# Patient Record
Sex: MALE | Race: BLACK OR AFRICAN AMERICAN | NOT HISPANIC OR LATINO | Employment: FULL TIME | URBAN - METROPOLITAN AREA
[De-identification: names, ages, dates, MRNs, and addresses within clinical notes are randomized per-mention and may not be internally consistent; named-entity substitution may affect disease eponyms.]

---

## 2020-03-26 ENCOUNTER — OFFICE VISIT (OUTPATIENT)
Dept: INTERNAL MEDICINE | Facility: CLINIC | Age: 35
End: 2020-03-26
Payer: COMMERCIAL

## 2020-03-26 VITALS
WEIGHT: 247 LBS | BODY MASS INDEX: 30.71 KG/M2 | DIASTOLIC BLOOD PRESSURE: 80 MMHG | HEART RATE: 78 BPM | TEMPERATURE: 99 F | SYSTOLIC BLOOD PRESSURE: 124 MMHG | HEIGHT: 75 IN

## 2020-03-26 DIAGNOSIS — K21.9 GASTROESOPHAGEAL REFLUX DISEASE WITHOUT ESOPHAGITIS: Primary | ICD-10-CM

## 2020-03-26 PROCEDURE — 99999 PR PBB SHADOW E&M-NEW PATIENT-LVL III: CPT | Mod: PBBFAC,,, | Performed by: FAMILY MEDICINE

## 2020-03-26 PROCEDURE — 99203 OFFICE O/P NEW LOW 30 MIN: CPT | Mod: S$GLB,,, | Performed by: FAMILY MEDICINE

## 2020-03-26 PROCEDURE — 99999 PR PBB SHADOW E&M-NEW PATIENT-LVL III: ICD-10-PCS | Mod: PBBFAC,,, | Performed by: FAMILY MEDICINE

## 2020-03-26 PROCEDURE — 99203 PR OFFICE/OUTPT VISIT, NEW, LEVL III, 30-44 MIN: ICD-10-PCS | Mod: S$GLB,,, | Performed by: FAMILY MEDICINE

## 2020-03-26 RX ORDER — LISINOPRIL 10 MG/1
10 TABLET ORAL
COMMUNITY
Start: 2019-08-12

## 2020-03-26 RX ORDER — ESOMEPRAZOLE MAGNESIUM 40 MG/1
40 CAPSULE, DELAYED RELEASE ORAL
Qty: 90 CAPSULE | Refills: 1 | Status: SHIPPED | OUTPATIENT
Start: 2020-03-26

## 2020-03-26 RX ORDER — ATENOLOL 25 MG/1
25 TABLET ORAL DAILY
COMMUNITY
Start: 2020-03-02

## 2020-03-26 NOTE — LETTER
March 26, 2020      [facility]  [address] [place] [address]  Phone: [phone]  Fax: [phone]       Patient: David Graham   YOB: 1985  Date of Visit: 03/26/2020    To Whom It May Concern:    Melly Graham  was at [facility] on 03/26/2020. He may return to work/school on 03/26/2020 with no restrictions. If you have any questions or concerns, or if I can be of further assistance, please do not hesitate to contact me.    Sincerely,    MD Aida Sinclair MA

## 2020-03-26 NOTE — PROGRESS NOTES
Subjective:       Patient ID: David Graham is a 34 y.o. male.    Chief Complaint: URI    Gastroesophageal Reflux   He reports no abdominal pain, no belching, no chest pain, no choking, no coughing, no dysphagia, no nausea, no sore throat or no wheezing. This is a new problem. The current episode started in the past 7 days. The problem occurs occasionally. The problem has been gradually improving. The symptoms are aggravated by certain foods. There are no known risk factors. The treatment provided mild relief.     Review of Systems   Constitutional: Negative for activity change.   HENT: Negative for ear pain and sore throat.    Eyes: Negative for pain.   Respiratory: Negative for cough, choking, shortness of breath and wheezing.    Cardiovascular: Negative for chest pain.   Gastrointestinal: Negative for abdominal pain, dysphagia and nausea.   Genitourinary: Negative for dysuria.   Musculoskeletal: Negative for neck pain.   Skin: Negative for rash.   Neurological: Negative for headaches.       Objective:      Physical Exam   Constitutional: He appears well-developed and well-nourished. No distress.   HENT:   Head: Normocephalic and atraumatic.   Nose: Right sinus exhibits no maxillary sinus tenderness and no frontal sinus tenderness. Left sinus exhibits no maxillary sinus tenderness and no frontal sinus tenderness.   Cardiovascular: Normal rate and regular rhythm.   Pulmonary/Chest: Effort normal and breath sounds normal. No respiratory distress. He has no wheezes.   Abdominal: Soft. Bowel sounds are normal. There is no tenderness.   Musculoskeletal: He exhibits no edema.   Neurological: He is alert.   Skin: Skin is warm and dry. No rash noted. He is not diaphoretic. No erythema.   Nursing note and vitals reviewed.      Assessment:       1. Gastroesophageal reflux disease without esophagitis        Plan:     Problem List Items Addressed This Visit        GI    Gastroesophageal reflux disease without  esophagitis - Primary    Relevant Medications    esomeprazole (NEXIUM) 40 MG capsule

## 2020-07-21 ENCOUNTER — OFFICE VISIT (OUTPATIENT)
Dept: URGENT CARE | Facility: CLINIC | Age: 35
End: 2020-07-21
Payer: COMMERCIAL

## 2020-07-21 VITALS
TEMPERATURE: 99 F | BODY MASS INDEX: 29.52 KG/M2 | WEIGHT: 230 LBS | SYSTOLIC BLOOD PRESSURE: 144 MMHG | HEIGHT: 74 IN | HEART RATE: 85 BPM | OXYGEN SATURATION: 96 % | RESPIRATION RATE: 18 BRPM | DIASTOLIC BLOOD PRESSURE: 85 MMHG

## 2020-07-21 DIAGNOSIS — R05.9 COUGH: Primary | ICD-10-CM

## 2020-07-21 DIAGNOSIS — R51.9 ACUTE NONINTRACTABLE HEADACHE, UNSPECIFIED HEADACHE TYPE: ICD-10-CM

## 2020-07-21 DIAGNOSIS — R09.81 NASAL CONGESTION: ICD-10-CM

## 2020-07-21 DIAGNOSIS — R06.02 SHORTNESS OF BREATH: ICD-10-CM

## 2020-07-21 DIAGNOSIS — R43.0 ANOSMIA: ICD-10-CM

## 2020-07-21 DIAGNOSIS — R06.7 SNEEZING: ICD-10-CM

## 2020-07-21 DIAGNOSIS — R43.9 DECREASED TASTE AND SMELL: ICD-10-CM

## 2020-07-21 PROCEDURE — 99214 OFFICE O/P EST MOD 30 MIN: CPT | Mod: S$GLB,,, | Performed by: PHYSICIAN ASSISTANT

## 2020-07-21 PROCEDURE — U0003 INFECTIOUS AGENT DETECTION BY NUCLEIC ACID (DNA OR RNA); SEVERE ACUTE RESPIRATORY SYNDROME CORONAVIRUS 2 (SARS-COV-2) (CORONAVIRUS DISEASE [COVID-19]), AMPLIFIED PROBE TECHNIQUE, MAKING USE OF HIGH THROUGHPUT TECHNOLOGIES AS DESCRIBED BY CMS-2020-01-R: HCPCS

## 2020-07-21 PROCEDURE — 99214 PR OFFICE/OUTPT VISIT, EST, LEVL IV, 30-39 MIN: ICD-10-PCS | Mod: S$GLB,,, | Performed by: PHYSICIAN ASSISTANT

## 2020-07-21 RX ORDER — PROMETHAZINE HYDROCHLORIDE AND DEXTROMETHORPHAN HYDROBROMIDE 6.25; 15 MG/5ML; MG/5ML
5 SYRUP ORAL NIGHTLY PRN
Qty: 118 ML | Refills: 0 | Status: SHIPPED | OUTPATIENT
Start: 2020-07-21

## 2020-07-21 RX ORDER — BENZONATATE 200 MG/1
200 CAPSULE ORAL 3 TIMES DAILY PRN
Qty: 30 CAPSULE | Refills: 0 | Status: SHIPPED | OUTPATIENT
Start: 2020-07-21

## 2020-07-21 RX ORDER — ALBUTEROL SULFATE 90 UG/1
2 AEROSOL, METERED RESPIRATORY (INHALATION) EVERY 6 HOURS PRN
Qty: 8 G | Refills: 0 | Status: SHIPPED | OUTPATIENT
Start: 2020-07-21

## 2020-07-21 NOTE — PROGRESS NOTES
"Subjective:       Patient ID: David Graham is a 35 y.o. male.    Vitals:  height is 6' 2" (1.88 m) and weight is 104.3 kg (230 lb). His tympanic temperature is 98.8 °F (37.1 °C). His blood pressure is 144/85 (abnormal) and his pulse is 85. His respiration is 18 and oxygen saturation is 96%.     Chief Complaint: COVID-19 Concerns    35 y.o. male presents for consideration of suspected Covid-19 symptoms. Patient reports generalized fatigue, chills, myalgia, headache, nasal congestion, cough with associated shortness of breath upon exertion, nausea and diarrhea which began 4 days ago. He reports that diarrhea only occurred once 2 days ago. He did not check him temperature, so he is unsure of possible fever. He denies sinus pain, sore throat, otalgia, emesis, dizziness or further symptoms. He notes that a co-worker tested positive for Covid-19 1.5 weeks ago. He attempted Tylenol and [place] cold & flu.     URI   This is a new problem. The current episode started in the past 7 days (4 days). The problem has been gradually worsening. There has been no fever. Associated symptoms include congestion, coughing, headaches, joint pain, nausea, rhinorrhea, sinus pain and sneezing. Pertinent negatives include no abdominal pain, chest pain, diarrhea, dysuria, ear pain, joint swelling, neck pain, plugged ear sensation, rash, sore throat, swollen glands, vomiting or wheezing. Associated symptoms comments: Loss of taste and smell. He has tried acetaminophen (Marleny Selzer Cold Plus) for the symptoms. The treatment provided mild relief.       Constitution: Positive for sweating and fatigue. Negative for chills and fever.   HENT: Positive for congestion, postnasal drip, sinus pain and sinus pressure. Negative for ear pain, ear discharge, foreign body in ear, tinnitus, nosebleeds, foreign body in nose, sore throat, trouble swallowing and voice change.    Neck: Negative for neck pain, neck stiffness and painful lymph nodes. "   Cardiovascular: Negative for chest pain and palpitations.   Eyes: Negative for eye itching, eye pain and eye redness.   Respiratory: Positive for cough, sputum production and shortness of breath. Negative for chest tightness, bloody sputum, COPD, stridor, wheezing and asthma.    Gastrointestinal: Positive for nausea. Negative for abdominal pain, vomiting, constipation and diarrhea.   Endocrine: negative.   Genitourinary: Negative for dysuria.   Musculoskeletal: Negative for muscle cramps and muscle ache.   Skin: Negative for rash.   Allergic/Immunologic: Positive for sneezing. Negative for seasonal allergies, asthma and immunocompromised state.   Neurological: Positive for headaches. Negative for disorientation.   Hematologic/Lymphatic: Negative for swollen lymph nodes.   Psychiatric/Behavioral: Negative.  Negative for disorientation and confusion.       Objective:      Physical Exam   Constitutional: He is oriented to person, place, and time. He appears well-developed. He is cooperative.  Non-toxic appearance. He does not appear ill. No distress.   HENT:   Head: Normocephalic.   Ears:   Right Ear: Tympanic membrane, external ear and ear canal normal. No drainage, swelling or tenderness. Tympanic membrane is not erythematous, not retracted and not bulging. No middle ear effusion.   Left Ear: Tympanic membrane, external ear and ear canal normal. No drainage, swelling or tenderness. Tympanic membrane is not erythematous, not retracted and not bulging.  No middle ear effusion.   Nose: Rhinorrhea (clear) and congestion present. No mucosal edema or purulent discharge. Right sinus exhibits no maxillary sinus tenderness and no frontal sinus tenderness. Left sinus exhibits no maxillary sinus tenderness and no frontal sinus tenderness.   Mouth/Throat: Uvula is midline and mucous membranes are normal. No oral lesions. No trismus in the jaw. No uvula swelling. Posterior oropharyngeal erythema (mild) present. No oropharyngeal  exudate, posterior oropharyngeal edema, tonsillar abscesses or cobblestoning.   Eyes: Pupils are equal, round, and reactive to light. Conjunctivae, EOM and lids are normal.   Neck: Trachea normal, normal range of motion, full passive range of motion without pain and phonation normal. Neck supple.   Cardiovascular: Normal rate, regular rhythm and normal heart sounds.   No murmur heard.  Pulmonary/Chest: Effort normal and breath sounds normal. No accessory muscle usage or stridor. No tachypnea and no bradypnea. No respiratory distress. He has no decreased breath sounds. He has no wheezes. He has no rhonchi. He has no rales.   Patient speaking in complete sentences without distress.    Comments: Patient speaking in complete sentences without distress.    Abdominal: Soft. Normal appearance and bowel sounds are normal. He exhibits no distension. There is no abdominal tenderness.   Musculoskeletal: Normal range of motion.   Neurological: He is alert and oriented to person, place, and time.   Skin: Skin is warm, dry, not diaphoretic and no rash. Capillary refill takes less than 2 seconds. Psychiatric: His behavior is normal. Judgment and thought content normal.   Nursing note and vitals reviewed.        Assessment:       1. Cough    2. Anosmia    3. Decreased taste and smell    4. Shortness of breath    5. Nasal congestion    6. Sneezing    7. Acute nonintractable headache, unspecified headache type        Plan:         Cough  -     COVID-19 Routine Screening  -     benzonatate (TESSALON) 200 MG capsule; Take 1 capsule (200 mg total) by mouth 3 (three) times daily as needed for Cough.  Dispense: 30 capsule; Refill: 0  -     promethazine-dextromethorphan (PROMETHAZINE-DM) 6.25-15 mg/5 mL Syrp; Take 5 mLs by mouth nightly as needed (cough).  Dispense: 118 mL; Refill: 0    Anosmia    Decreased taste and smell    Shortness of breath  -     albuterol (PROVENTIL/VENTOLIN HFA) 90 mcg/actuation inhaler; Inhale 2 puffs into the  lungs every 6 (six) hours as needed for Wheezing or Shortness of Breath. Rescue  Dispense: 8 g; Refill: 0    Nasal congestion    Sneezing    Acute nonintractable headache, unspecified headache type         - Vitals are reassuring.  - Covid screen pending.  - Advised patient to assume positive for Covid until receiving results, therefore stay home and self quarantine until 10 days from onset of symptoms, and asymptomatic for at least 72 hours prior to resuming normal activity.  - Educated patient regarding medications for symptomatic relief.  - Strict ED precautions given for any emergent symptoms.      I have discussed the diagnosis, treatment plan and recommendations for follow-up with primary care, and patient verbalized understanding and is agreeable to the plan. AVS printed and given to patient upon discharge with information regarding this visit. All questions were addressed prior to discharge.    Polly Rooney PA-C

## 2020-07-21 NOTE — LETTER
[address], [address] ? [place], [address] ? Phone [phone] ? Fax             Return to Work/School    Patient: David Graham  YOB: 1985   Date: 07/21/2020      To Whom It May Concern:     David Graham was in contact with/seen in my office on 07/21/2020. COVID-19 is present in our communities across the state. Not all patients are eligible or appropriate to be tested. In this situation, your employee meets the following criteria:     David Graham has met the criteria for COVID-19 testing based upon symptoms, travel, and/or potential exposure. The test has been completed and is pending results at this time. During this time the employee is not able to work and should be quarantined per the Centers for Disease Control timelines.      If you have any questions or concerns, or if I can be of further assistance, please do not hesitate to contact me.     Sincerely,    Polly Rooney PA-C

## 2020-07-24 ENCOUNTER — TELEPHONE (OUTPATIENT)
Dept: URGENT CARE | Facility: CLINIC | Age: 35
End: 2020-07-24

## 2020-07-24 LAB — SARS-COV-2 RNA RESP QL NAA+PROBE: DETECTED

## 2020-07-24 NOTE — TELEPHONE ENCOUNTER
Spoke with patient and informed him of positive Covid-19 test result after obtaining pt identifiers. Advised to continue with 14 day quarantine, wearing a mask, otc symptom management, and sanitizing home and hands. Report to ER for any difficulty breathing. Pt verbalized understanding.

## 2020-07-28 ENCOUNTER — TELEPHONE (OUTPATIENT)
Dept: URGENT CARE | Facility: CLINIC | Age: 35
End: 2020-07-28

## 2020-07-28 NOTE — TELEPHONE ENCOUNTER
Patient came into clinic and requested a copy of covid test results for employer. Results printed and given to patient.

## 2021-07-01 ENCOUNTER — PATIENT MESSAGE (OUTPATIENT)
Dept: ADMINISTRATIVE | Facility: OTHER | Age: 36
End: 2021-07-01

## 2022-03-29 ENCOUNTER — PATIENT MESSAGE (OUTPATIENT)
Dept: RESEARCH | Facility: HOSPITAL | Age: 37
End: 2022-03-29
Payer: COMMERCIAL